# Patient Record
Sex: FEMALE
[De-identification: names, ages, dates, MRNs, and addresses within clinical notes are randomized per-mention and may not be internally consistent; named-entity substitution may affect disease eponyms.]

---

## 2018-11-29 ENCOUNTER — HOSPITAL ENCOUNTER (EMERGENCY)
Dept: HOSPITAL 92 - ERS | Age: 18
Discharge: HOME | End: 2018-11-29

## 2018-11-29 DIAGNOSIS — F41.9: ICD-10-CM

## 2018-11-29 DIAGNOSIS — Z87.891: ICD-10-CM

## 2018-11-29 DIAGNOSIS — R55: Primary | ICD-10-CM

## 2018-11-29 DIAGNOSIS — Z85.43: ICD-10-CM

## 2018-11-29 DIAGNOSIS — F32.9: ICD-10-CM
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
## 2018-11-29 LAB
ALBUMIN SERPL BCG-MCNC: 4.7 G/DL (ref 3.5–5)
ALP SERPL-CCNC: 72 U/L (ref 40–150)
ALT SERPL W P-5'-P-CCNC: 16 U/L (ref 8–55)
ANION GAP SERPL CALC-SCNC: 12 MMOL/L (ref 10–20)
AST SERPL-CCNC: 22 U/L (ref 5–34)
BASOPHILS # BLD AUTO: 0 THOU/UL (ref 0–0.2)
BASOPHILS NFR BLD AUTO: 0.4 % (ref 0–1)
BILIRUB SERPL-MCNC: 1 MG/DL (ref 0.2–1.2)
BUN SERPL-MCNC: 13 MG/DL (ref 7–18.7)
CALCIUM SERPL-MCNC: 9.9 MG/DL (ref 7.8–10.44)
CHLORIDE SERPL-SCNC: 106 MMOL/L (ref 98–107)
CO2 SERPL-SCNC: 25 MMOL/L (ref 22–29)
CREAT CL PREDICTED SERPL C-G-VRATE: 0 ML/MIN (ref 70–130)
DRUG SCREEN CUTOFF: (no result)
EOSINOPHIL # BLD AUTO: 0.1 THOU/UL (ref 0–0.7)
EOSINOPHIL NFR BLD AUTO: 1.3 % (ref 0–10)
GLOBULIN SER CALC-MCNC: 3.3 G/DL (ref 2.4–3.5)
GLUCOSE SERPL-MCNC: 69 MG/DL (ref 70–105)
HGB BLD-MCNC: 14 G/DL (ref 12–16)
LYMPHOCYTES # BLD: 1.9 THOU/UL (ref 1.2–3.4)
LYMPHOCYTES NFR BLD AUTO: 16.8 % (ref 28–48)
MCH RBC QN AUTO: 29 PG (ref 25–35)
MCV RBC AUTO: 83.5 FL (ref 78–98)
MEDTOX CONTROL LINE VALID?: (no result)
MEDTOX READER #: (no result)
MONOCYTES # BLD AUTO: 0.6 THOU/UL (ref 0.11–0.59)
MONOCYTES NFR BLD AUTO: 5.4 % (ref 0–4)
NEUTROPHILS # BLD AUTO: 8.7 THOU/UL (ref 1.4–6.5)
NEUTROPHILS NFR BLD AUTO: 76.1 % (ref 31–61)
PLATELET # BLD AUTO: 242 THOU/UL (ref 130–400)
POTASSIUM SERPL-SCNC: 3.7 MMOL/L (ref 3.5–5.1)
PREGU CONTROL BACKGROUND?: (no result)
PREGU CONTROL BAR APPEAR?: YES
RBC # BLD AUTO: 4.85 MILL/UL (ref 4–5.2)
SODIUM SERPL-SCNC: 139 MMOL/L (ref 136–145)
WBC # BLD AUTO: 11.4 THOU/UL (ref 4.8–10.8)

## 2018-11-29 PROCEDURE — 80306 DRUG TEST PRSMV INSTRMNT: CPT

## 2018-11-29 PROCEDURE — 71045 X-RAY EXAM CHEST 1 VIEW: CPT

## 2018-11-29 PROCEDURE — 84146 ASSAY OF PROLACTIN: CPT

## 2018-11-29 PROCEDURE — 70450 CT HEAD/BRAIN W/O DYE: CPT

## 2018-11-29 PROCEDURE — 70486 CT MAXILLOFACIAL W/O DYE: CPT

## 2018-11-29 PROCEDURE — A4353 INTERMITTENT URINARY CATH: HCPCS

## 2018-11-29 PROCEDURE — 80053 COMPREHEN METABOLIC PANEL: CPT

## 2018-11-29 PROCEDURE — 51701 INSERT BLADDER CATHETER: CPT

## 2018-11-29 PROCEDURE — 72131 CT LUMBAR SPINE W/O DYE: CPT

## 2018-11-29 PROCEDURE — 72125 CT NECK SPINE W/O DYE: CPT

## 2018-11-29 PROCEDURE — 93005 ELECTROCARDIOGRAM TRACING: CPT

## 2018-11-29 PROCEDURE — 85025 COMPLETE CBC W/AUTO DIFF WBC: CPT

## 2018-11-29 PROCEDURE — 72128 CT CHEST SPINE W/O DYE: CPT

## 2018-11-29 PROCEDURE — 81025 URINE PREGNANCY TEST: CPT

## 2018-11-29 NOTE — CT
HEAD CT WITHOUT CONTRAST:

 

Date: 11-29-18

 

Comparison: None. 

 

History: Fall, possible seizure. 

 

Technique: Serial axial CT imaging is obtained at 5 mm intervals from vertex through skull base witho
ut contrast. 

 

FINDINGS: 

The visualized paranasal sinuses/mastoid air cells are well aerated. 

 

There is no displaced calvarial fracture seen. 

 

No intracranial hemorrhage, midline shift, mass effect, or ventricular enlargement. 

 

IMPRESSION: 

No acute findings. If there is clinical concern for a seizure focus, a follow up brain MRI is advised
. 

 

POS: [facility]

## 2018-11-29 NOTE — CT
CT OF THE FACIAL BONES

11/29/18

 

COMPARISON:  

None.

 

HISTORY: 

Injury, trauma, pain.

 

TECHNIQUE:  

Axial CT imaging at 2.5 mm intervals through the face with coronal and sagittal reformatted imaging. 


 

FINDINGS:  

The frontal sinuses, ethmoid air cells, maxillary sinuses, and sphenoid sinuses are unremarkable. Herminia
ged mastoid air cells are unremarkable. 

 

The nasal bones, zygomatic arches, and pterygoid plates are intact. 

 

Temporomandibular joints appear unremarkable. The mandible is intact as is the maxilla. No evidence f
or maxillofacial fracture. The orbital floor and the medial orbital wall appears intact bilaterally. 


 

IMPRESSION:  

No acute osseous abnormality. 

 

POS: [facility]

## 2018-11-29 NOTE — CT
LUMBAR SPINE CT WITHOUT CONTRAST:

11/29/18

 

COMPARISON:  

None.

 

HISTORY: 

Injury, trauma, pain.

 

TECHNIQUE:  

Axial CT imaging at 2.5 mm intervals through the lumbar spine with coronal and sagittal reformatted i
maging. 

 

FINDINGS:  

Evaluation for central canal and/or neural foraminal stenosis is limited on routine CT. 

 

Five lumbar type vertebral bodies are present. Lumbar vertebral body height and alignment is normal. 


 

The imaged retroperitoneal structures demonstrate punctate bilateral renal calculi within the lower p
ole of each kidney. 

 

No widening of the sacroiliac joints. There is a benign bone island within the right sacral ala. 

 

There is no osseous cause of significant central canal or neural foraminal stenosis within the lumbar
 spine. No lumbar spine fracture. No evidence for dislocation. 

 

IMPRESSION:  

No acute osseous abnormality. 

 

POS: [facility]

## 2018-11-29 NOTE — CT
CERVICAL SPINE CT WITHOUT CONTRAST:

11/29/18

 

COMPARISON:  

None.

 

HISTORY: 

Fall, trauma, possible seizure.

 

TECHNIQUE:  

Axial CT imaging at 2.5 mm intervals from skull base through lung apices with coronal and sagittal re
formatted imaging. 

 

FINDINGS:  

The imaged lung apices unremarkable. 

 

The occipital condyles, the dens, the C1-2 articulation, the craniocervical junction, and the cervico
thoracic junction appear unremarkable. No prevertebral soft tissue swelling. Cervical vertebral body 
height and alignment appears within normal limits. No acute fracture or evidence of dislocation. 

 

IMPRESSION:  

No acute osseous abnormality.

 

POS: [facility]

## 2018-11-29 NOTE — RAD
PORTABLE UPRIGHT FRONTAL CHEST RADIOGRAPH:

 

Date: 11-29-18

 

Comparison: None. 

 

History: Fall, trauma, pain. 

 

FINDINGS: 

No pneumothorax, pleural fluid, focal consolidation or alveolar edema. Heart and mediastinal contours
 are unremarkable. 

 

IMPRESSION: 

No acute findings. 

 

POS: [facility]
